# Patient Record
Sex: FEMALE | URBAN - METROPOLITAN AREA
[De-identification: names, ages, dates, MRNs, and addresses within clinical notes are randomized per-mention and may not be internally consistent; named-entity substitution may affect disease eponyms.]

---

## 2022-05-31 ENCOUNTER — OUT OF OFFICE VISIT (OUTPATIENT)
Dept: URBAN - METROPOLITAN AREA MEDICAL CENTER 9 | Facility: MEDICAL CENTER | Age: 72
End: 2022-05-31
Payer: MEDICARE

## 2022-05-31 DIAGNOSIS — K59.09 CHANGE IN BOWEL MOVEMENTS INTERMITTENT CONSTIPATION. URGENCY IN THE MORNING.: ICD-10-CM

## 2022-05-31 PROCEDURE — 99221 1ST HOSP IP/OBS SF/LOW 40: CPT | Performed by: PHYSICIAN ASSISTANT

## 2022-05-31 PROCEDURE — G8427 DOCREV CUR MEDS BY ELIG CLIN: HCPCS | Performed by: PHYSICIAN ASSISTANT

## 2022-06-07 ENCOUNTER — TELEPHONE ENCOUNTER (OUTPATIENT)
Dept: URBAN - METROPOLITAN AREA CLINIC 40 | Facility: CLINIC | Age: 72
End: 2022-06-07

## 2022-06-13 ENCOUNTER — OFFICE VISIT (OUTPATIENT)
Dept: URBAN - METROPOLITAN AREA CLINIC 40 | Facility: CLINIC | Age: 72
End: 2022-06-13

## 2022-07-01 ENCOUNTER — TELEPHONE ENCOUNTER (OUTPATIENT)
Dept: URBAN - METROPOLITAN AREA CLINIC 40 | Facility: CLINIC | Age: 72
End: 2022-07-01

## 2022-07-07 ENCOUNTER — OFFICE VISIT (OUTPATIENT)
Dept: URBAN - METROPOLITAN AREA CLINIC 40 | Facility: CLINIC | Age: 72
End: 2022-07-07

## 2022-07-07 RX ORDER — ONDANSETRON HYDROCHLORIDE 4 MG/1
1 TABLET TABLET, FILM COATED ORAL ONCE A DAY
Status: ACTIVE | COMMUNITY

## 2022-07-07 RX ORDER — CARVEDILOL 12.5 MG/1
1 TABLET WITH FOOD TABLET, FILM COATED ORAL TWICE A DAY
Status: ACTIVE | COMMUNITY

## 2022-07-07 RX ORDER — POLYETHYLENE GLYCOL 1450
AS DIRECTED POWDER (GRAM) MISCELLANEOUS
Status: ACTIVE | COMMUNITY

## 2022-07-07 RX ORDER — NITROGLYCERIN 0.4 MG/1
AS DIRECTED TABLET SUBLINGUAL
Status: ACTIVE | COMMUNITY

## 2022-07-07 RX ORDER — ATORVASTATIN CALCIUM 10 MG/1
1 TABLET TABLET, FILM COATED ORAL ONCE A DAY
Status: ACTIVE | COMMUNITY

## 2022-07-07 RX ORDER — HYDRALAZINE HYDROCHLORIDE 10 MG/1
1 TABLET WITH FOOD TABLET, FILM COATED ORAL
Status: ACTIVE | COMMUNITY

## 2022-07-07 RX ORDER — LOSARTAN POTASSIUM 100 MG/1
1 TABLET TABLET ORAL ONCE A DAY
Status: ACTIVE | COMMUNITY

## 2022-07-07 RX ORDER — PANTOPRAZOLE SODIUM 40 MG/1
1 TABLET TABLET, DELAYED RELEASE ORAL ONCE A DAY
Status: ACTIVE | COMMUNITY

## 2022-07-07 RX ORDER — MIRABEGRON 50 MG/1
1 TABLET TABLET, FILM COATED, EXTENDED RELEASE ORAL ONCE A DAY
Status: ACTIVE | COMMUNITY

## 2022-08-11 ENCOUNTER — OFFICE VISIT (OUTPATIENT)
Dept: URBAN - METROPOLITAN AREA CLINIC 40 | Facility: CLINIC | Age: 72
End: 2022-08-11

## 2023-02-23 ENCOUNTER — OFFICE VISIT (OUTPATIENT)
Dept: URBAN - METROPOLITAN AREA TELEHEALTH 2 | Facility: TELEHEALTH | Age: 73
End: 2023-02-23
Payer: MEDICARE

## 2023-02-23 ENCOUNTER — TELEPHONE ENCOUNTER (OUTPATIENT)
Dept: URBAN - METROPOLITAN AREA CLINIC 40 | Facility: CLINIC | Age: 73
End: 2023-02-23

## 2023-02-23 DIAGNOSIS — K21.9 GASTROESOPHAGEAL REFLUX DISEASE, UNSPECIFIED WHETHER ESOPHAGITIS PRESENT: ICD-10-CM

## 2023-02-23 DIAGNOSIS — K59.09 CHANGE IN BOWEL MOVEMENTS INTERMITTENT CONSTIPATION. URGENCY IN THE MORNING.: ICD-10-CM

## 2023-02-23 DIAGNOSIS — R19.7 DIARRHEA, UNSPECIFIED TYPE: ICD-10-CM

## 2023-02-23 DIAGNOSIS — R11.2 NAUSEA AND VOMITING, UNSPECIFIED VOMITING TYPE: ICD-10-CM

## 2023-02-23 PROBLEM — 235595009: Status: ACTIVE | Noted: 2023-02-22

## 2023-02-23 PROBLEM — 35298007: Status: ACTIVE | Noted: 2023-02-22

## 2023-02-23 PROCEDURE — 99214 OFFICE O/P EST MOD 30 MIN: CPT | Performed by: INTERNAL MEDICINE

## 2023-02-23 RX ORDER — FLUTICASONE PROPIONATE 50 UG/1
AS DIRECTED SPRAY, METERED NASAL
Status: ACTIVE | COMMUNITY

## 2023-02-23 RX ORDER — PANTOPRAZOLE SODIUM 40 MG/1
1 TABLET TABLET, DELAYED RELEASE ORAL ONCE A DAY
Qty: 90 TABLET | Refills: 3

## 2023-02-23 RX ORDER — COLESTIPOL HYDROCHLORIDE 1 G/1
AS DIRECTED TABLET, FILM COATED ORAL TWICE DAILY
Qty: 60 | Refills: 3 | OUTPATIENT
Start: 2023-02-23

## 2023-02-23 RX ORDER — POLYETHYLENE GLYCOL 1450
AS DIRECTED POWDER (GRAM) MISCELLANEOUS
Status: DISCONTINUED | COMMUNITY

## 2023-02-23 RX ORDER — LOSARTAN POTASSIUM 50 MG/1
AS DIRECTED TABLET ORAL
Status: ACTIVE | COMMUNITY

## 2023-02-23 RX ORDER — ONDANSETRON HYDROCHLORIDE 4 MG/1
1 TABLET TABLET, FILM COATED ORAL ONCE A DAY
Status: ACTIVE | COMMUNITY

## 2023-02-23 RX ORDER — ATORVASTATIN CALCIUM 10 MG/1
1 TABLET TABLET, FILM COATED ORAL ONCE A DAY
Status: DISCONTINUED | COMMUNITY

## 2023-02-23 RX ORDER — MIRABEGRON 50 MG/1
1 TABLET TABLET, FILM COATED, EXTENDED RELEASE ORAL ONCE A DAY
Status: DISCONTINUED | COMMUNITY

## 2023-02-23 RX ORDER — NITROGLYCERIN 0.4 MG/1
AS DIRECTED TABLET SUBLINGUAL
Status: DISCONTINUED | COMMUNITY

## 2023-02-23 RX ORDER — ALBUTEROL SULFATE 90 UG/1
AS DIRECTED AEROSOL, METERED RESPIRATORY (INHALATION)
Status: ACTIVE | COMMUNITY

## 2023-02-23 RX ORDER — CARVEDILOL 12.5 MG/1
1 TABLET WITH FOOD TABLET, FILM COATED ORAL TWICE A DAY
Status: ACTIVE | COMMUNITY

## 2023-02-23 RX ORDER — PANTOPRAZOLE SODIUM 40 MG/1
1 TABLET TABLET, DELAYED RELEASE ORAL ONCE A DAY
Status: ACTIVE | COMMUNITY

## 2023-02-23 RX ORDER — NITROFURANTOIN MONOHYDRATE/MACROCRYSTALLINE 25; 75 MG/1; MG/1
AS DIRECTED CAPSULE ORAL
Status: ACTIVE | COMMUNITY

## 2023-02-23 RX ORDER — HYDRALAZINE HYDROCHLORIDE 10 MG/1
1 TABLET WITH FOOD TABLET, FILM COATED ORAL
Status: DISCONTINUED | COMMUNITY

## 2023-02-23 NOTE — HPI-TODAY'S VISIT:
Patient was seen in consultation at the hospital on 5/31/2022 by Dr. Aguilera.  She has a history of diverticulosis and constipation.  Prior to the consultation she had no previous GI evaluation and no EGD or colonoscopy.  She does take pantoprazole daily for reflux.  She underwent abdominal CT scan that showed colonic diverticulosis and colonic fecal loading.  She was continued on pantoprazole 40 mg p.o. daily and Amitiza 24 mcg twice daily was added she was advised to follow-up in the GI clinic to schedule colonoscopy as an outpatient. Patient was seen in the emergency room on 2/22/2023 for recent history of  diarrhea and  intermittent vomiting.  Her CBC and CMP were essentially within normal limits she was treated for presumptive urinary tract infection and empirically given Zofran and Imodium. Patient returns for follow-up on 2/23/2023.  She states that shortly after her discharge from the hospital in May 2022, with a brief course of Amitiza therapy, her constipation did resolve and she is no longer on this medication.  She did continue pantoprazole 40 mg p.o. daily for her reflux, and states that this helped somewhat several weeks ago the dosing was increased to twice daily by her pulmonologist, and she believes this may be contributing to her diarrhea.  She has discontinued the evening dose, and now takes pantoprazole 40 mg in the morning.  She is giving a 3-week history of watery diarrhea with accompanying loose stools multiple times per day.  This has been accompanied by episodes of nausea and vomiting.  She never did schedule the colonoscopy as an outpatient which was recommended following her hospitalization in May 2022.  At the ER evaluation last evening, she was treated for presumptive urinary tract infection, and given a prescription for Zofran and Imodium.  As stated her CBC and CMP were essentially normal.  She denies any significant abdominal pain, she is complaining of intermittent episodes of mild dysphagia to solid food.  She has had no previous colonoscopy or EGD.

## 2023-03-10 ENCOUNTER — TELEPHONE ENCOUNTER (OUTPATIENT)
Dept: URBAN - METROPOLITAN AREA CLINIC 92 | Facility: CLINIC | Age: 73
End: 2023-03-10

## 2023-03-10 RX ORDER — COLESTIPOL HYDROCHLORIDE 1 G/1
2 TABLETS TABLET, FILM COATED ORAL TID
Qty: 180 TABLET | Refills: 1 | OUTPATIENT
Start: 2023-03-10

## 2023-03-13 ENCOUNTER — OFFICE VISIT (OUTPATIENT)
Dept: URBAN - METROPOLITAN AREA TELEHEALTH 2 | Facility: TELEHEALTH | Age: 73
End: 2023-03-13

## 2023-03-22 ENCOUNTER — WEB ENCOUNTER (OUTPATIENT)
Dept: URBAN - METROPOLITAN AREA CLINIC 40 | Facility: CLINIC | Age: 73
End: 2023-03-22

## 2023-03-28 ENCOUNTER — OFFICE VISIT (OUTPATIENT)
Dept: URBAN - METROPOLITAN AREA CLINIC 40 | Facility: CLINIC | Age: 73
End: 2023-03-28
Payer: MEDICARE

## 2023-03-28 VITALS
TEMPERATURE: 95 F | DIASTOLIC BLOOD PRESSURE: 70 MMHG | BODY MASS INDEX: 30.37 KG/M2 | HEART RATE: 106 BPM | SYSTOLIC BLOOD PRESSURE: 130 MMHG | WEIGHT: 189 LBS | HEIGHT: 66 IN

## 2023-03-28 DIAGNOSIS — K59.01 SLOW TRANSIT CONSTIPATION: ICD-10-CM

## 2023-03-28 DIAGNOSIS — K21.9 GASTROESOPHAGEAL REFLUX DISEASE, UNSPECIFIED WHETHER ESOPHAGITIS PRESENT: ICD-10-CM

## 2023-03-28 DIAGNOSIS — R13.19 ESOPHAGEAL DYSPHAGIA: ICD-10-CM

## 2023-03-28 DIAGNOSIS — R11.2 NAUSEA AND VOMITING, UNSPECIFIED VOMITING TYPE: ICD-10-CM

## 2023-03-28 DIAGNOSIS — R19.7 DIARRHEA, UNSPECIFIED TYPE: ICD-10-CM

## 2023-03-28 PROCEDURE — 99214 OFFICE O/P EST MOD 30 MIN: CPT | Performed by: INTERNAL MEDICINE

## 2023-03-28 RX ORDER — COLESTIPOL HYDROCHLORIDE 1 G/1
2 TABLETS TABLET, FILM COATED ORAL TID
Qty: 180 TABLET | Refills: 1 | Status: ACTIVE | COMMUNITY
Start: 2023-03-10

## 2023-03-28 RX ORDER — ONDANSETRON HYDROCHLORIDE 4 MG/1
1 TABLET TABLET, FILM COATED ORAL ONCE A DAY
Status: ACTIVE | COMMUNITY

## 2023-03-28 RX ORDER — CARVEDILOL 12.5 MG/1
1 TABLET WITH FOOD TABLET, FILM COATED ORAL TWICE A DAY
Status: ACTIVE | COMMUNITY

## 2023-03-28 RX ORDER — FLUTICASONE PROPIONATE 50 UG/1
AS DIRECTED SPRAY, METERED NASAL
Status: ACTIVE | COMMUNITY

## 2023-03-28 RX ORDER — LOSARTAN POTASSIUM 50 MG/1
AS DIRECTED TABLET ORAL
Status: ACTIVE | COMMUNITY

## 2023-03-28 RX ORDER — PANTOPRAZOLE SODIUM 40 MG/1
1 TABLET TABLET, DELAYED RELEASE ORAL ONCE A DAY
Qty: 90 TABLET | Refills: 3 | Status: ACTIVE | COMMUNITY

## 2023-03-28 RX ORDER — COLESTIPOL HYDROCHLORIDE 1 G/1
AS DIRECTED TABLET, FILM COATED ORAL TWICE DAILY
Qty: 60 | Refills: 3 | Status: ACTIVE | COMMUNITY
Start: 2023-02-23

## 2023-03-28 RX ORDER — ALBUTEROL SULFATE 90 UG/1
AS DIRECTED AEROSOL, METERED RESPIRATORY (INHALATION)
Status: ACTIVE | COMMUNITY

## 2023-03-28 RX ORDER — NITROFURANTOIN MONOHYDRATE/MACROCRYSTALLINE 25; 75 MG/1; MG/1
AS DIRECTED CAPSULE ORAL
Status: ACTIVE | COMMUNITY

## 2023-03-28 RX ORDER — COLESTIPOL HYDROCHLORIDE 1 G/1
AS DIRECTED TABLET, FILM COATED ORAL TWICE DAILY
Qty: 60 | Refills: 3 | OUTPATIENT

## 2023-03-28 NOTE — HPI-TODAY'S VISIT:
Patient was seen in consultation at the hospital on 5/31/2022 by Dr. Aguilera.  She has a history of diverticulosis and constipation.  Prior to the consultation she had no previous GI evaluation and no EGD or colonoscopy.  She does take pantoprazole daily for reflux.  She underwent abdominal CT scan that showed colonic diverticulosis and colonic fecal loading.  She was continued on pantoprazole 40 mg p.o. daily and Amitiza 24 mcg twice daily was added she was advised to follow-up in the GI clinic to schedule colonoscopy as an outpatient. Patient was seen in the emergency room on 2/22/2023 for recent history of  diarrhea and  intermittent vomiting.  Her CBC and CMP were essentially within normal limits she was treated for presumptive urinary tract infection and empirically given Zofran and Imodium. Patient returns for follow-up on 2/23/2023.  She states that shortly after her discharge from the hospital in May 2022, with a brief course of Amitiza therapy, her constipation did resolve and she is no longer on this medication.  She did continue pantoprazole 40 mg p.o. daily for her reflux, and states that this helped somewhat several weeks ago the dosing was increased to twice daily by her pulmonologist, and she believes this may be contributing to her diarrhea.  She has discontinued the evening dose, and now takes pantoprazole 40 mg in the morning.  She is giving a 3-week history of watery diarrhea with accompanying loose stools multiple times per day.  This has been accompanied by episodes of nausea and vomiting.  She never did schedule the colonoscopy as an outpatient which was recommended following her hospitalization in May 2022.  At the ER evaluation last evening, she was treated for presumptive urinary tract infection, and given a prescription for Zofran and Imodium.  As stated her CBC and CMP were essentially normal.  She denies any significant abdominal pain, she is complaining of intermittent episodes of mild dysphagia to solid food.  She has had no previous colonoscopy or EGD. Patient returns for follow-up on 3/28/2023.  Due to a change in insurance, and family emergency she has not yet completed her GPP 22 as previously ordered.  She is also yet to begin her colestipol as prescribed for her reflux she has been taking daily pantoprazole.  Overall her swallowing has been satisfactory.  Since her last visit she has complained of recurrent episodes of diarrhea which when they occur she will have about 3 loose to watery stools per day this is accompanied by lower abdominal discomfort she denies overt GI bleeding she has lost about 5 pounds in weight since her last visit.

## 2023-03-29 ENCOUNTER — LAB OUTSIDE AN ENCOUNTER (OUTPATIENT)
Dept: URBAN - METROPOLITAN AREA CLINIC 40 | Facility: CLINIC | Age: 73
End: 2023-03-29

## 2023-03-31 ENCOUNTER — ERX REFILL RESPONSE (OUTPATIENT)
Dept: URBAN - METROPOLITAN AREA CLINIC 40 | Facility: CLINIC | Age: 73
End: 2023-03-31

## 2023-03-31 RX ORDER — COLESTIPOL HYDROCHLORIDE 1 G/1
TAKE 2 TABLETS BY MOUTH 3 TIMES A DAY FOR 30 DAYS TABLET, FILM COATED ORAL
Qty: 540 TABLET | Refills: 1 | OUTPATIENT

## 2023-04-06 LAB
ADENOVIRUS F 40/41: NOT DETECTED
CAMPYLOBACTER: NOT DETECTED
CLOSTRIDIUM DIFFICILE: NOT DETECTED
CRYPTOSPORIDIUM: NOT DETECTED
ENTAMOEBA HISTOLYTICA: NOT DETECTED
ENTEROAGGREGATIVE E.COLI: NOT DETECTED
ENTEROTOXIGENIC E.COLI: NOT DETECTED
ESCHERICHIA COLI O157: NOT DETECTED
GIARDIA LAMBLIA: NOT DETECTED
NOROVIRUS GI/GII: NOT DETECTED
ROTAVIRUS A: NOT DETECTED
SALMONELLA SPP.: NOT DETECTED
SHIGA-LIKE TOXIN PRODUCING E.COLI: NOT DETECTED
SHIGELLA SPP. / ENTEROINVASIVE E.COLI: NOT DETECTED
VIBRIO PARAHAEMOLYTICUS: NOT DETECTED
VIBRIO SPP.: NOT DETECTED
YERSINIA ENTEROCOLITICA: NOT DETECTED

## 2023-06-15 ENCOUNTER — LAB OUTSIDE AN ENCOUNTER (OUTPATIENT)
Dept: URBAN - METROPOLITAN AREA CLINIC 40 | Facility: CLINIC | Age: 73
End: 2023-06-15

## 2023-06-15 ENCOUNTER — OFFICE VISIT (OUTPATIENT)
Dept: URBAN - METROPOLITAN AREA CLINIC 40 | Facility: CLINIC | Age: 73
End: 2023-06-15
Payer: MEDICARE

## 2023-06-15 VITALS
SYSTOLIC BLOOD PRESSURE: 130 MMHG | TEMPERATURE: 95.2 F | HEART RATE: 62 BPM | HEIGHT: 66 IN | BODY MASS INDEX: 31.12 KG/M2 | WEIGHT: 193.6 LBS | DIASTOLIC BLOOD PRESSURE: 70 MMHG

## 2023-06-15 DIAGNOSIS — R19.7 DIARRHEA, UNSPECIFIED TYPE: ICD-10-CM

## 2023-06-15 DIAGNOSIS — K21.9 GASTROESOPHAGEAL REFLUX DISEASE, UNSPECIFIED WHETHER ESOPHAGITIS PRESENT: ICD-10-CM

## 2023-06-15 DIAGNOSIS — R13.19 ESOPHAGEAL DYSPHAGIA: ICD-10-CM

## 2023-06-15 DIAGNOSIS — R11.2 NAUSEA AND VOMITING, UNSPECIFIED VOMITING TYPE: ICD-10-CM

## 2023-06-15 DIAGNOSIS — K59.01 SLOW TRANSIT CONSTIPATION: ICD-10-CM

## 2023-06-15 DIAGNOSIS — Z12.11 SCREENING FOR COLON CANCER: ICD-10-CM

## 2023-06-15 PROCEDURE — 99214 OFFICE O/P EST MOD 30 MIN: CPT | Performed by: INTERNAL MEDICINE

## 2023-06-15 RX ORDER — ONDANSETRON HYDROCHLORIDE 4 MG/1
1 TABLET TABLET, FILM COATED ORAL ONCE A DAY
Status: ACTIVE | COMMUNITY

## 2023-06-15 RX ORDER — LOSARTAN POTASSIUM 50 MG/1
AS DIRECTED TABLET ORAL
Status: ON HOLD | COMMUNITY

## 2023-06-15 RX ORDER — PANTOPRAZOLE SODIUM 40 MG/1
1 TABLET TABLET, DELAYED RELEASE ORAL ONCE A DAY
Qty: 90 TABLET | Refills: 3 | Status: ACTIVE | COMMUNITY

## 2023-06-15 RX ORDER — COLESTIPOL HYDROCHLORIDE 1 G/1
TAKE 2 TABLETS BY MOUTH 3 TIMES A DAY FOR 30 DAYS TABLET, FILM COATED ORAL
Qty: 540 TABLET | Refills: 1 | Status: ACTIVE | COMMUNITY

## 2023-06-15 RX ORDER — ALBUTEROL SULFATE 90 UG/1
AS DIRECTED AEROSOL, METERED RESPIRATORY (INHALATION)
Status: ACTIVE | COMMUNITY

## 2023-06-15 RX ORDER — LOSARTAN POTASSIUM 25 MG/1
1 TABLET TABLET ORAL ONCE A DAY
Status: ACTIVE | COMMUNITY

## 2023-06-15 RX ORDER — CARVEDILOL 12.5 MG/1
1 TABLET WITH FOOD TABLET, FILM COATED ORAL TWICE A DAY
Status: ACTIVE | COMMUNITY

## 2023-06-15 RX ORDER — NITROFURANTOIN MONOHYDRATE/MACROCRYSTALLINE 25; 75 MG/1; MG/1
AS DIRECTED CAPSULE ORAL
Status: ACTIVE | COMMUNITY

## 2023-06-15 RX ORDER — FLUTICASONE PROPIONATE 50 UG/1
AS DIRECTED SPRAY, METERED NASAL
Status: ACTIVE | COMMUNITY

## 2023-06-15 RX ORDER — ONDANSETRON 4 MG/1
1 TABLET ON THE TONGUE AND ALLOW TO DISSOLVE TABLET, ORALLY DISINTEGRATING ORAL ONCE A DAY
Qty: 30 | Refills: 3 | OUTPATIENT
Start: 2023-06-15

## 2023-06-15 NOTE — PHYSICAL EXAM CHEST:
chest wall non-tender, breathing is unlabored without accessory muscle use, normal breath sounds
None

## 2023-06-15 NOTE — HPI-TODAY'S VISIT:
Patient was seen in consultation at the hospital on 5/31/2022 by Dr. Aguilera.  She has a history of diverticulosis and constipation.  Prior to the consultation she had no previous GI evaluation and no EGD or colonoscopy.  She does take pantoprazole daily for reflux.  She underwent abdominal CT scan that showed colonic diverticulosis and colonic fecal loading.  She was continued on pantoprazole 40 mg p.o. daily and Amitiza 24 mcg twice daily was added she was advised to follow-up in the GI clinic to schedule colonoscopy as an outpatient. Patient was seen in the emergency room on 2/22/2023 for recent history of  diarrhea and  intermittent vomiting.  Her CBC and CMP were essentially within normal limits she was treated for presumptive urinary tract infection and empirically given Zofran and Imodium. Patient returns for follow-up on 2/23/2023.  She states that shortly after her discharge from the hospital in May 2022, with a brief course of Amitiza therapy, her constipation did resolve and she is no longer on this medication.  She did continue pantoprazole 40 mg p.o. daily for her reflux, and states that this helped somewhat several weeks ago the dosing was increased to twice daily by her pulmonologist, and she believes this may be contributing to her diarrhea.  She has discontinued the evening dose, and now takes pantoprazole 40 mg in the morning.  She is giving a 3-week history of watery diarrhea with accompanying loose stools multiple times per day.  This has been accompanied by episodes of nausea and vomiting.  She never did schedule the colonoscopy as an outpatient which was recommended following her hospitalization in May 2022.  At the ER evaluation last evening, she was treated for presumptive urinary tract infection, and given a prescription for Zofran and Imodium.  As stated her CBC and CMP were essentially normal.  She denies any significant abdominal pain, she is complaining of intermittent episodes of mild dysphagia to solid food.  She has had no previous colonoscopy or EGD. Patient returns for follow-up on 3/28/2023.  Due to a change in insurance, and family emergency she has not yet completed her GPP 22 as previously ordered.  She is also yet to begin her colestipol as prescribed for her reflux she has been taking daily pantoprazole.  Overall her swallowing has been satisfactory.  Since her last visit she has complained of recurrent episodes of diarrhea which when they occur she will have about 3 loose to watery stools per day this is accompanied by lower abdominal discomfort she denies overt GI bleeding she has lost about 5 pounds in weight since her last visit. Patient's GPP 14 collected March 2023 was negative. Patient returns for follow-up on 6/15/2023.  She states that she has had a flareup of her reflux, with episodes of nausea and vomiting.  She is also complaining of episodes of dysphagia to solid food.  She does have mild abdominal discomfort.  She has been complaining of recent constipation, but will have intermittent episodes of diarrhea.  I advised her to try adding MiraLAX 1-2 times daily as a more gentle laxative.  She does drink plenty of fluids, and will be elevating the head of her bed.  She has had no unusual weight loss, no overt GI bleeding.  We do need to schedule her screening colonoscopy, with her diagnostic EGD we will obtain both pulmonary and cardiac clearance.

## 2024-03-21 ENCOUNTER — OV EP (OUTPATIENT)
Dept: URBAN - METROPOLITAN AREA CLINIC 40 | Facility: CLINIC | Age: 74
End: 2024-03-21

## 2024-03-21 RX ORDER — LOSARTAN POTASSIUM 50 MG/1
AS DIRECTED TABLET ORAL
Status: ON HOLD | COMMUNITY

## 2024-03-21 RX ORDER — PANTOPRAZOLE SODIUM 40 MG/1
TAKE 1 TABLET BY MOUTH EVERY DAY FOR 90 DAYS TABLET, DELAYED RELEASE ORAL
Qty: 90 TABLET | Refills: 3 | Status: ACTIVE | COMMUNITY

## 2024-03-21 RX ORDER — ONDANSETRON 4 MG/1
1 TABLET ON THE TONGUE AND ALLOW TO DISSOLVE TABLET, ORALLY DISINTEGRATING ORAL ONCE A DAY
Qty: 30 | Refills: 3 | Status: ACTIVE | COMMUNITY
Start: 2023-06-15

## 2024-03-21 RX ORDER — ONDANSETRON 4 MG/1
1 TABLET ON THE TONGUE AND ALLOW TO DISSOLVE TABLET, ORALLY DISINTEGRATING ORAL ONCE A DAY
Qty: 30 | Refills: 3 | OUTPATIENT

## 2024-03-21 RX ORDER — NITROFURANTOIN MONOHYDRATE/MACROCRYSTALLINE 25; 75 MG/1; MG/1
AS DIRECTED CAPSULE ORAL
Status: ACTIVE | COMMUNITY

## 2024-03-21 RX ORDER — ALBUTEROL SULFATE 90 UG/1
AS DIRECTED AEROSOL, METERED RESPIRATORY (INHALATION)
Status: ACTIVE | COMMUNITY

## 2024-03-21 RX ORDER — ONDANSETRON HYDROCHLORIDE 4 MG/1
1 TABLET TABLET, FILM COATED ORAL ONCE A DAY
Status: ACTIVE | COMMUNITY

## 2024-03-21 RX ORDER — COLESTIPOL HYDROCHLORIDE 1 G/1
TAKE 2 TABLETS BY MOUTH 3 TIMES A DAY FOR 30 DAYS TABLET, FILM COATED ORAL
Qty: 540 TABLET | Refills: 1 | Status: ACTIVE | COMMUNITY

## 2024-03-21 RX ORDER — LOSARTAN POTASSIUM 25 MG/1
1 TABLET TABLET ORAL ONCE A DAY
Status: ACTIVE | COMMUNITY

## 2024-03-21 RX ORDER — FLUTICASONE PROPIONATE 50 UG/1
AS DIRECTED SPRAY, METERED NASAL
Status: ACTIVE | COMMUNITY

## 2024-03-21 RX ORDER — CARVEDILOL 12.5 MG/1
1 TABLET WITH FOOD TABLET, FILM COATED ORAL TWICE A DAY
Status: ACTIVE | COMMUNITY

## 2024-04-30 ENCOUNTER — OV EP (OUTPATIENT)
Dept: URBAN - METROPOLITAN AREA CLINIC 40 | Facility: CLINIC | Age: 74
End: 2024-04-30
Payer: MEDICARE

## 2024-04-30 ENCOUNTER — LAB (OUTPATIENT)
Dept: URBAN - METROPOLITAN AREA CLINIC 40 | Facility: CLINIC | Age: 74
End: 2024-04-30

## 2024-04-30 VITALS
TEMPERATURE: 97.9 F | BODY MASS INDEX: 30.44 KG/M2 | SYSTOLIC BLOOD PRESSURE: 132 MMHG | HEIGHT: 66 IN | WEIGHT: 189.4 LBS | DIASTOLIC BLOOD PRESSURE: 78 MMHG | HEART RATE: 87 BPM

## 2024-04-30 DIAGNOSIS — R19.7 DIARRHEA, UNSPECIFIED TYPE: ICD-10-CM

## 2024-04-30 DIAGNOSIS — K21.9 GASTROESOPHAGEAL REFLUX DISEASE, UNSPECIFIED WHETHER ESOPHAGITIS PRESENT: ICD-10-CM

## 2024-04-30 DIAGNOSIS — R13.19 ESOPHAGEAL DYSPHAGIA: ICD-10-CM

## 2024-04-30 DIAGNOSIS — K59.01 SLOW TRANSIT CONSTIPATION: ICD-10-CM

## 2024-04-30 DIAGNOSIS — R10.30 LOWER ABDOMINAL PAIN: ICD-10-CM

## 2024-04-30 DIAGNOSIS — R11.2 NAUSEA AND VOMITING, UNSPECIFIED VOMITING TYPE: ICD-10-CM

## 2024-04-30 DIAGNOSIS — Z12.11 SCREENING FOR COLON CANCER: ICD-10-CM

## 2024-04-30 PROCEDURE — 99214 OFFICE O/P EST MOD 30 MIN: CPT | Performed by: INTERNAL MEDICINE

## 2024-04-30 RX ORDER — COLESTIPOL HYDROCHLORIDE 1 G/1
TAKE 2 TABLETS BY MOUTH 3 TIMES A DAY FOR 30 DAYS TABLET, FILM COATED ORAL
Qty: 540 TABLET | Refills: 1 | Status: ON HOLD | COMMUNITY

## 2024-04-30 RX ORDER — ALBUTEROL SULFATE 90 UG/1
AS DIRECTED AEROSOL, METERED RESPIRATORY (INHALATION)
Status: ACTIVE | COMMUNITY

## 2024-04-30 RX ORDER — ONDANSETRON HYDROCHLORIDE 4 MG/1
1 TABLET TABLET, FILM COATED ORAL ONCE A DAY
Status: ON HOLD | COMMUNITY

## 2024-04-30 RX ORDER — ONDANSETRON 4 MG/1
1 TABLET ON THE TONGUE AND ALLOW TO DISSOLVE TABLET, ORALLY DISINTEGRATING ORAL ONCE A DAY
Qty: 30 | Refills: 3 | Status: ON HOLD | COMMUNITY

## 2024-04-30 RX ORDER — PANTOPRAZOLE SODIUM 40 MG/1
TAKE 1 TABLET BY MOUTH EVERY DAY FOR 90 DAYS TABLET, DELAYED RELEASE ORAL
Qty: 90 TABLET | Refills: 3 | Status: ACTIVE | COMMUNITY

## 2024-04-30 RX ORDER — CARVEDILOL 12.5 MG/1
1 TABLET WITH FOOD TABLET, FILM COATED ORAL TWICE A DAY
Status: ON HOLD | COMMUNITY

## 2024-04-30 RX ORDER — ONDANSETRON 4 MG/1
1 TABLET ON THE TONGUE AND ALLOW TO DISSOLVE TABLET, ORALLY DISINTEGRATING ORAL ONCE A DAY
Qty: 30 | Refills: 3 | OUTPATIENT

## 2024-04-30 RX ORDER — FLUTICASONE PROPIONATE 50 UG/1
AS DIRECTED SPRAY, METERED NASAL
Status: ACTIVE | COMMUNITY

## 2024-04-30 RX ORDER — LOSARTAN POTASSIUM 50 MG/1
AS DIRECTED TABLET ORAL
Status: ON HOLD | COMMUNITY

## 2024-04-30 RX ORDER — PANTOPRAZOLE SODIUM 40 MG/1
1 TABLET TABLET, DELAYED RELEASE ORAL ONCE A DAY
Qty: 90 TABLET | Refills: 3 | OUTPATIENT
Start: 2024-04-30

## 2024-04-30 RX ORDER — LOSARTAN POTASSIUM 25 MG/1
1 TABLET TABLET ORAL ONCE A DAY
Status: ACTIVE | COMMUNITY

## 2024-04-30 RX ORDER — NITROFURANTOIN MONOHYDRATE/MACROCRYSTALLINE 25; 75 MG/1; MG/1
AS DIRECTED CAPSULE ORAL
Status: ON HOLD | COMMUNITY

## 2024-04-30 NOTE — HPI-TODAY'S VISIT:
Patient was seen in consultation at the hospital on 5/31/2022 by Dr. Aguilera.  She has a history of diverticulosis and constipation.  Prior to the consultation she had no previous GI evaluation and no EGD or colonoscopy.  She does take pantoprazole daily for reflux.  She underwent abdominal CT scan that showed colonic diverticulosis and colonic fecal loading.  She was continued on pantoprazole 40 mg p.o. daily and Amitiza 24 mcg twice daily was added she was advised to follow-up in the GI clinic to schedule colonoscopy as an outpatient. Patient was seen in the emergency room on 2/22/2023 for recent history of  diarrhea and  intermittent vomiting.  Her CBC and CMP were essentially within normal limits she was treated for presumptive urinary tract infection and empirically given Zofran and Imodium. Patient returns for follow-up on 2/23/2023.  She states that shortly after her discharge from the hospital in May 2022, with a brief course of Amitiza therapy, her constipation did resolve and she is no longer on this medication.  She did continue pantoprazole 40 mg p.o. daily for her reflux, and states that this helped somewhat several weeks ago the dosing was increased to twice daily by her pulmonologist, and she believes this may be contributing to her diarrhea.  She has discontinued the evening dose, and now takes pantoprazole 40 mg in the morning.  She is giving a 3-week history of watery diarrhea with accompanying loose stools multiple times per day.  This has been accompanied by episodes of nausea and vomiting.  She never did schedule the colonoscopy as an outpatient which was recommended following her hospitalization in May 2022.  At the ER evaluation last evening, she was treated for presumptive urinary tract infection, and given a prescription for Zofran and Imodium.  As stated her CBC and CMP were essentially normal.  She denies any significant abdominal pain, she is complaining of intermittent episodes of mild dysphagia to solid food.  She has had no previous colonoscopy or EGD. Patient returns for follow-up on 3/28/2023.  Due to a change in insurance, and family emergency she has not yet completed her GPP 22 as previously ordered.  She is also yet to begin her colestipol as prescribed for her reflux she has been taking daily pantoprazole.  Overall her swallowing has been satisfactory.  Since her last visit she has complained of recurrent episodes of diarrhea which when they occur she will have about 3 loose to watery stools per day this is accompanied by lower abdominal discomfort she denies overt GI bleeding she has lost about 5 pounds in weight since her last visit. Patient's GPP 14 collected March 2023 was negative. Patient returns for follow-up on 6/15/2023.  She states that she has had a flareup of her reflux, with episodes of nausea and vomiting.  She is also complaining of episodes of dysphagia to solid food.  She does have mild abdominal discomfort.  She has been complaining of recent constipation, but will have intermittent episodes of diarrhea.  I advised her to try adding MiraLAX 1-2 times daily as a more gentle laxative.  She does drink plenty of fluids, and will be elevating the head of her bed.  She has had no unusual weight loss, no overt GI bleeding.  We do need to schedule her screening colonoscopy, with her diagnostic EGD we will obtain both pulmonary and cardiac clearance. Patient returns for follow-up on 4/30/2024.  She states that she will still experience lower abdominal pain accompanied by diarrhea at least 3 times a week .  She has had no previous colonoscopy.  She denies any overt GI bleeding or unexplained weight loss.  She has had a long history of heartburn and reflux, which is well-controlled on pantoprazole 40 mg daily she does complain of dysphagia to solid food, where at times food will temporarily lodged in her retrosternal region.  She has had no previous upper endoscopic evaluation. 130

## 2024-05-13 ENCOUNTER — OUT OF OFFICE VISIT (OUTPATIENT)
Dept: URBAN - METROPOLITAN AREA SURGERY CENTER 30 | Facility: SURGERY CENTER | Age: 74
End: 2024-05-13
Payer: MEDICARE

## 2024-05-13 ENCOUNTER — TELEPHONE ENCOUNTER (OUTPATIENT)
Dept: URBAN - METROPOLITAN AREA CLINIC 40 | Facility: CLINIC | Age: 74
End: 2024-05-13

## 2024-05-13 ENCOUNTER — CLAIMS CREATED FROM THE CLAIM WINDOW (OUTPATIENT)
Dept: URBAN - METROPOLITAN AREA CLINIC 4 | Facility: CLINIC | Age: 74
End: 2024-05-13
Payer: MEDICARE

## 2024-05-13 DIAGNOSIS — K31.89 OTHER DISEASES OF STOMACH AND DUODENUM: ICD-10-CM

## 2024-05-13 DIAGNOSIS — K57.30 DIVERTICULOSIS OF LARGE INTESTINE WITHOUT PERFORATION OR ABSCESS WITHOUT BLEEDING: ICD-10-CM

## 2024-05-13 DIAGNOSIS — K21.9 GASTRO-ESOPHAGEAL REFLUX DISEASE WITHOUT ESOPHAGITIS: ICD-10-CM

## 2024-05-13 DIAGNOSIS — R13.19 CERVICAL DYSPHAGIA: ICD-10-CM

## 2024-05-13 DIAGNOSIS — K21.9 ACID REFLUX: ICD-10-CM

## 2024-05-13 DIAGNOSIS — K63.89 OTHER SPECIFIED DISEASES OF INTESTINE: ICD-10-CM

## 2024-05-13 DIAGNOSIS — R19.7 ACUTE DIARRHEA: ICD-10-CM

## 2024-05-13 PROCEDURE — 88342 IMHCHEM/IMCYTCHM 1ST ANTB: CPT | Performed by: PATHOLOGY

## 2024-05-13 PROCEDURE — 43450 DILATE ESOPHAGUS 1/MULT PASS: CPT | Performed by: INTERNAL MEDICINE

## 2024-05-13 PROCEDURE — 88305 TISSUE EXAM BY PATHOLOGIST: CPT | Performed by: PATHOLOGY

## 2024-05-13 PROCEDURE — 43239 EGD BIOPSY SINGLE/MULTIPLE: CPT | Performed by: INTERNAL MEDICINE

## 2024-05-13 PROCEDURE — 45380 COLONOSCOPY AND BIOPSY: CPT | Performed by: INTERNAL MEDICINE

## 2024-05-13 PROCEDURE — 88312 SPECIAL STAINS GROUP 1: CPT | Performed by: PATHOLOGY

## 2024-05-13 PROCEDURE — G8907 PT DOC NO EVENTS ON DISCHARG: HCPCS | Performed by: INTERNAL MEDICINE

## 2024-05-13 PROCEDURE — 00813 ANES UPR LWR GI NDSC PX: CPT | Performed by: NURSE ANESTHETIST, CERTIFIED REGISTERED

## 2024-05-13 PROCEDURE — 88313 SPECIAL STAINS GROUP 2: CPT | Performed by: PATHOLOGY

## 2024-05-13 RX ORDER — ONDANSETRON 4 MG/1
1 TABLET ON THE TONGUE AND ALLOW TO DISSOLVE TABLET, ORALLY DISINTEGRATING ORAL ONCE A DAY
Qty: 30 | Refills: 3 | Status: ACTIVE | COMMUNITY

## 2024-05-13 RX ORDER — ONDANSETRON HYDROCHLORIDE 4 MG/1
1 TABLET TABLET, FILM COATED ORAL ONCE A DAY
Status: ON HOLD | COMMUNITY

## 2024-05-13 RX ORDER — PANTOPRAZOLE SODIUM 40 MG/1
1 TABLET TABLET, DELAYED RELEASE ORAL ONCE A DAY
Qty: 90 TABLET | Refills: 3 | Status: ACTIVE | COMMUNITY
Start: 2024-04-30

## 2024-05-13 RX ORDER — COLESTIPOL HYDROCHLORIDE 1 G/1
TAKE 2 TABLETS BY MOUTH 3 TIMES A DAY FOR 30 DAYS TABLET, FILM COATED ORAL
Qty: 540 TABLET | Refills: 1 | Status: ON HOLD | COMMUNITY

## 2024-05-13 RX ORDER — CARVEDILOL 12.5 MG/1
1 TABLET WITH FOOD TABLET, FILM COATED ORAL TWICE A DAY
Status: ON HOLD | COMMUNITY

## 2024-05-13 RX ORDER — PANTOPRAZOLE SODIUM 40 MG/1
TAKE 1 TABLET BY MOUTH EVERY DAY FOR 90 DAYS TABLET, DELAYED RELEASE ORAL
Qty: 90 TABLET | Refills: 3 | Status: ACTIVE | COMMUNITY

## 2024-05-13 RX ORDER — FLUTICASONE PROPIONATE 50 UG/1
AS DIRECTED SPRAY, METERED NASAL
Status: ACTIVE | COMMUNITY

## 2024-05-13 RX ORDER — LOSARTAN POTASSIUM 25 MG/1
1 TABLET TABLET ORAL ONCE A DAY
Status: ACTIVE | COMMUNITY

## 2024-05-13 RX ORDER — NITROFURANTOIN MONOHYDRATE/MACROCRYSTALLINE 25; 75 MG/1; MG/1
AS DIRECTED CAPSULE ORAL
Status: ON HOLD | COMMUNITY

## 2024-05-13 RX ORDER — ALBUTEROL SULFATE 90 UG/1
AS DIRECTED AEROSOL, METERED RESPIRATORY (INHALATION)
Status: ACTIVE | COMMUNITY

## 2024-05-13 RX ORDER — LOSARTAN POTASSIUM 50 MG/1
AS DIRECTED TABLET ORAL
Status: ON HOLD | COMMUNITY

## 2024-05-13 RX ORDER — PANTOPRAZOLE SODIUM 40 MG/1
1 TABLET TABLET, DELAYED RELEASE ORAL ONCE A DAY
Qty: 90 TABLET | Refills: 3 | OUTPATIENT

## 2024-05-13 RX ORDER — ONDANSETRON 4 MG/1
1 TABLET ON THE TONGUE AND ALLOW TO DISSOLVE TABLET, ORALLY DISINTEGRATING ORAL ONCE A DAY
Qty: 30 | Refills: 3 | OUTPATIENT

## 2024-06-11 ENCOUNTER — DASHBOARD ENCOUNTERS (OUTPATIENT)
Age: 74
End: 2024-06-11

## 2024-06-11 ENCOUNTER — TELEPHONE ENCOUNTER (OUTPATIENT)
Dept: URBAN - METROPOLITAN AREA CLINIC 40 | Facility: CLINIC | Age: 74
End: 2024-06-11

## 2024-06-12 ENCOUNTER — OFFICE VISIT (OUTPATIENT)
Dept: URBAN - METROPOLITAN AREA CLINIC 40 | Facility: CLINIC | Age: 74
End: 2024-06-12

## 2024-06-12 RX ORDER — ONDANSETRON 4 MG/1
1 TABLET ON THE TONGUE AND ALLOW TO DISSOLVE TABLET, ORALLY DISINTEGRATING ORAL ONCE A DAY
Qty: 30 | Refills: 3 | Status: ACTIVE | COMMUNITY

## 2024-06-12 RX ORDER — ALBUTEROL SULFATE 90 UG/1
AS DIRECTED AEROSOL, METERED RESPIRATORY (INHALATION)
Status: ACTIVE | COMMUNITY

## 2024-06-12 RX ORDER — PANTOPRAZOLE SODIUM 40 MG/1
TAKE 1 TABLET BY MOUTH EVERY DAY FOR 90 DAYS TABLET, DELAYED RELEASE ORAL
Qty: 90 TABLET | Refills: 3 | Status: ACTIVE | COMMUNITY

## 2024-06-12 RX ORDER — LOSARTAN POTASSIUM 25 MG/1
1 TABLET TABLET ORAL ONCE A DAY
Status: ACTIVE | COMMUNITY

## 2024-06-12 RX ORDER — NITROFURANTOIN MONOHYDRATE/MACROCRYSTALLINE 25; 75 MG/1; MG/1
AS DIRECTED CAPSULE ORAL
COMMUNITY

## 2024-06-12 RX ORDER — LOSARTAN POTASSIUM 50 MG/1
AS DIRECTED TABLET ORAL
COMMUNITY

## 2024-06-12 RX ORDER — FLUTICASONE PROPIONATE 50 UG/1
AS DIRECTED SPRAY, METERED NASAL
Status: ACTIVE | COMMUNITY

## 2024-06-12 RX ORDER — COLESTIPOL HYDROCHLORIDE 1 G/1
TAKE 2 TABLETS BY MOUTH 3 TIMES A DAY FOR 30 DAYS TABLET, FILM COATED ORAL
Qty: 540 TABLET | Refills: 1 | COMMUNITY

## 2024-06-12 RX ORDER — CARVEDILOL 12.5 MG/1
1 TABLET WITH FOOD TABLET, FILM COATED ORAL TWICE A DAY
COMMUNITY

## 2024-06-12 RX ORDER — ONDANSETRON HYDROCHLORIDE 4 MG/1
1 TABLET TABLET, FILM COATED ORAL ONCE A DAY
COMMUNITY

## 2024-06-12 RX ORDER — PANTOPRAZOLE SODIUM 40 MG/1
1 TABLET TABLET, DELAYED RELEASE ORAL ONCE A DAY
Qty: 90 TABLET | Refills: 3 | Status: ACTIVE | COMMUNITY

## 2024-06-14 ENCOUNTER — OFFICE VISIT (OUTPATIENT)
Dept: URBAN - METROPOLITAN AREA CLINIC 40 | Facility: CLINIC | Age: 74
End: 2024-06-14

## 2024-06-14 RX ORDER — FLUTICASONE PROPIONATE 50 UG/1
AS DIRECTED SPRAY, METERED NASAL
Status: ACTIVE | COMMUNITY

## 2024-06-14 RX ORDER — PANTOPRAZOLE SODIUM 40 MG/1
TAKE 1 TABLET BY MOUTH EVERY DAY FOR 90 DAYS TABLET, DELAYED RELEASE ORAL
Qty: 90 TABLET | Refills: 3 | Status: ON HOLD | COMMUNITY

## 2024-06-14 RX ORDER — LOSARTAN POTASSIUM 25 MG/1
1 TABLET TABLET ORAL ONCE A DAY
Status: ACTIVE | COMMUNITY

## 2024-06-14 RX ORDER — LOSARTAN POTASSIUM 50 MG/1
AS DIRECTED TABLET ORAL
Status: ON HOLD | COMMUNITY

## 2024-06-14 RX ORDER — ONDANSETRON HYDROCHLORIDE 4 MG/1
1 TABLET TABLET, FILM COATED ORAL ONCE A DAY
Status: ON HOLD | COMMUNITY

## 2024-06-14 RX ORDER — CARVEDILOL 12.5 MG/1
1 TABLET WITH FOOD TABLET, FILM COATED ORAL TWICE A DAY
Status: ON HOLD | COMMUNITY

## 2024-06-14 RX ORDER — PANTOPRAZOLE SODIUM 40 MG/1
1 TABLET TABLET, DELAYED RELEASE ORAL ONCE A DAY
Qty: 90 TABLET | Refills: 3 | Status: ACTIVE | COMMUNITY

## 2024-06-14 RX ORDER — ONDANSETRON 4 MG/1
1 TABLET ON THE TONGUE AND ALLOW TO DISSOLVE TABLET, ORALLY DISINTEGRATING ORAL ONCE A DAY
Qty: 30 | Refills: 3 | Status: ACTIVE | COMMUNITY

## 2024-06-14 RX ORDER — COLESTIPOL HYDROCHLORIDE 1 G/1
TAKE 2 TABLETS BY MOUTH 3 TIMES A DAY FOR 30 DAYS TABLET, FILM COATED ORAL
Qty: 540 TABLET | Refills: 1 | Status: ON HOLD | COMMUNITY

## 2024-06-14 RX ORDER — ALBUTEROL SULFATE 90 UG/1
AS DIRECTED AEROSOL, METERED RESPIRATORY (INHALATION)
Status: ACTIVE | COMMUNITY

## 2024-06-14 RX ORDER — NITROFURANTOIN MONOHYDRATE/MACROCRYSTALLINE 25; 75 MG/1; MG/1
AS DIRECTED CAPSULE ORAL
Status: ON HOLD | COMMUNITY

## 2024-06-14 NOTE — HPI-TODAY'S VISIT:
Ms. Osorio is a 73 year old Black female with a history of diverticulosis and constipation. She underwent abdominal CT scan that showed colonic diverticulosis and colonic fecal loading.  Patient was seen in the emergency room on 2/22/2023 for recent history of  diarrhea and  intermittent vomiting.  Her CBC and CMP were essentially within normal limits she was treated for presumptive urinary tract infection and empirically given Zofran and Imodium. Patient's GPP 14 collected March 2023 was negative. She has noted alternating constipation and diarrhea. Normal colonoscopy May 13, 2024.  Multiple diverticula throughout the colon.  Remaining exam normal.  Random biopsies obtained from the colon with no evidence of active, chronic or microscopic colitis.  She also had EGD with findings of mild gastritis.  Medium size hiatal hernia noted.  PPI effect of the gastric biopsies with no H. pylori or intestinal metaplasia.  Reflux changes with esophageal biopsies.  No Hall's, EOE or infection.  Small bowel biopsies unremarkable, no evidence of celiac sprue.

## 2024-06-28 ENCOUNTER — TELEPHONE ENCOUNTER (OUTPATIENT)
Dept: URBAN - METROPOLITAN AREA CLINIC 40 | Facility: CLINIC | Age: 74
End: 2024-06-28

## 2024-11-10 ENCOUNTER — ERX REFILL RESPONSE (OUTPATIENT)
Dept: URBAN - METROPOLITAN AREA CLINIC 40 | Facility: CLINIC | Age: 74
End: 2024-11-10

## 2024-11-10 RX ORDER — ONDANSETRON 4 MG/1
1 TABLET ON THE TONGUE AND ALLOW TO DISSOLVE TABLET, ORALLY DISINTEGRATING ORAL ONCE A DAY
Qty: 30 | Refills: 3 | OUTPATIENT

## 2024-11-10 RX ORDER — ONDANSETRON 4 MG/1
DISSOLVE 1 TABLET ON THE TONGUE BY MOUTH EVERY DAY TABLET, ORALLY DISINTEGRATING ORAL
Qty: 30 TABLET | Refills: 3 | OUTPATIENT